# Patient Record
Sex: FEMALE | Race: WHITE | NOT HISPANIC OR LATINO | URBAN - NONMETROPOLITAN AREA
[De-identification: names, ages, dates, MRNs, and addresses within clinical notes are randomized per-mention and may not be internally consistent; named-entity substitution may affect disease eponyms.]

---

## 2017-01-19 ENCOUNTER — OFFICE VISIT (OUTPATIENT)
Dept: RETAIL CLINIC | Facility: CLINIC | Age: 52
End: 2017-01-19

## 2017-01-19 VITALS
HEART RATE: 92 BPM | WEIGHT: 204 LBS | HEIGHT: 63 IN | OXYGEN SATURATION: 97 % | TEMPERATURE: 98.5 F | BODY MASS INDEX: 36.14 KG/M2

## 2017-01-19 DIAGNOSIS — J01.00 ACUTE MAXILLARY SINUSITIS, RECURRENCE NOT SPECIFIED: Primary | ICD-10-CM

## 2017-01-19 PROCEDURE — 99213 OFFICE O/P EST LOW 20 MIN: CPT | Performed by: NURSE PRACTITIONER

## 2017-01-19 RX ORDER — FLUTICASONE PROPIONATE 50 MCG
2 SPRAY, SUSPENSION (ML) NASAL DAILY
Qty: 1 EACH | Refills: 0 | Status: SHIPPED | OUTPATIENT
Start: 2017-01-19 | End: 2017-02-18

## 2017-01-19 RX ORDER — AMOXICILLIN AND CLAVULANATE POTASSIUM 875; 125 MG/1; MG/1
1 TABLET, FILM COATED ORAL 2 TIMES DAILY
Qty: 20 TABLET | Refills: 0 | Status: SHIPPED | OUTPATIENT
Start: 2017-01-19 | End: 2017-01-29

## 2017-01-19 NOTE — PROGRESS NOTES
"Subjective   Lorna Johnson is a 51 y.o. female. Presents to the clinic today with a chief complaint of   Chief Complaint   Patient presents with   • URI        History of Present Illness     Pt presents with 2-3 week history of symptoms of nasal congestion, drainage in the back of her throat and cough.  Symptoms now worsening, bilaterally ears have a lot of pressure, she is having headaches and facial pressure.     Ms Johnson also asks if the roof of her mouth can be examined.  She states that she has a \"soft\" spot under where her dentures are placed in her mouth.       See ROS    The following portions of the patient's history were reviewed and updated as appropriate: allergies, current medications, past family history, past medical history, past social history, past surgical history and problem list.    Review of Systems   Constitutional: Negative for appetite change, chills, diaphoresis and fever.   HENT: Positive for congestion, ear pain (bilateral), postnasal drip, rhinorrhea, sinus pressure and sore throat (intermittently). Negative for ear discharge, hearing loss, trouble swallowing and voice change.    Eyes: Negative for redness and itching.   Respiratory: Positive for cough (productive of green sputum), shortness of breath and wheezing. Negative for chest tightness.    Gastrointestinal: Positive for nausea (\"due to the drainage\"). Negative for abdominal pain, diarrhea and vomiting.   Musculoskeletal: Positive for myalgias (neck hurts).   Skin: Negative for rash.   Neurological: Positive for headaches. Negative for dizziness.       Objective   No Known Allergies  Pulse 92, temperature 98.5 °F (36.9 °C), height 63\" (160 cm), weight 204 lb (92.5 kg), SpO2 97 %.    Physical Exam  General Appearance: Mildly ill appearing, No acute distress, Pleasant and Cooperative  Head/face:  Normocephalic, atraumatic  Eyes:   no conjunctival injection and non-icteric  Ears:   Bilateral TMs are mildly erythematous with diffuse " light reflex , canals are clear, no pinna or tragus tenderness with palpation  Nose/Sinuses:   Nares boggy , mucoid nasal drainage, bilateral maxillary sinus tenderness and bilateral frontal sinus tenderness  Mouth/Throat:  Posterior pharynx is mildly erythematous with a moderate amount of clear drainage and palate appears intact-- no erythema, no swelling, no lesion noted  Neck:  Supple without lymphadenopathy; trachea is midline  Lungs:  Clear to auscultation bilaterally  Heart:   Regular rate and rhythm without murmur    Assessment/Plan   Lorna was seen today for uri.    Diagnoses and all orders for this visit:    Acute maxillary sinusitis, recurrence not specified  -     amoxicillin-clavulanate (AUGMENTIN) 875-125 MG per tablet; Take 1 tablet by mouth 2 (Two) Times a Day for 10 days.  -     fluticasone (FLONASE) 50 MCG/ACT nasal spray; 2 sprays into each nostril Daily for 30 days.    Signs and symptoms as well as physical exam findings were discussed with the patient.  Plan of care was completed and patient agreed with the treatment plan. We have discussed the area of concern on your palate.  There does not appear to be any lesion or suspicious area.  However, you have agreed to see your dentist regarding your concerns.   After visit summary was given.      Patient to follow up if symptoms worsen.               Nidia Guerrero, APRN

## 2017-01-19 NOTE — MR AVS SNAPSHOT
Lorna Johnson   1/19/2017 4:45 PM   Office Visit    Dept Phone:  [phone]   Encounter #:  [id number]    Provider:  PROVIDER BEC SOMERSET   Department:  [facility]                Your Full Care Plan              Today's Medication Changes          These changes are accurate as of: 1/19/17  5:27 PM.  If you have any questions, ask your nurse or doctor.               New Medication(s)Ordered:     amoxicillin-clavulanate 875-125 MG per tablet   Commonly known as:  AUGMENTIN   Take 1 tablet by mouth 2 (Two) Times a Day for 10 days.       fluticasone 50 MCG/ACT nasal spray   Commonly known as:  FLONASE   2 sprays into each nostril Daily for 30 days.            Where to Get Your Medications      These medications were sent to [facility] [address] [place] [address] - [address] [place] [address] - [phone] [facility] [phone] [address] [address]     Phone:  [phone]     amoxicillin-clavulanate 875-125 MG per tablet    fluticasone 50 MCG/ACT nasal spray                  Your Updated Medication List          This list is accurate as of: 1/19/17  5:27 PM.  Always use your most recent med list.                amoxicillin-clavulanate 875-125 MG per tablet   Commonly known as:  AUGMENTIN   Take 1 tablet by mouth 2 (Two) Times a Day for 10 days.       fluticasone 50 MCG/ACT nasal spray   Commonly known as:  FLONASE   2 sprays into each nostril Daily for 30 days.       metFORMIN 500 MG tablet   Commonly known as:  GLUCOPHAGE               You Were Diagnosed With        Codes Comments    Acute maxillary sinusitis, recurrence not specified    -  Primary ICD-10-CM: J01.00  ICD-9-CM: 461.0       Instructions    You have been diagnosed with a sinus infection, also known as bacterial sinusitis.  An antibiotic has been prescribed for you.  Please take this antibiotic as directed for the full duration, even if you are starting to feel better.  In addition to the antibiotics, an  inhaled corticosteroid has been prescribed.  For the next 3 days, please use this nasal spray twice a day.  As we discussed, you should blow your nose gently before administering the nasal spray.  Tip your chin to your chest and use the opposite hand to nostril.Gently sniffing as you compress the nasal spray nozzle.  After use, you may find it helpful to rinse out your mouth and throat by gargling. After three days, you should use the nasal spray just once daily.    You may take OTC tylenol (acetaminophen) or motrin (ibuprofen) for pain and/or fever.  Additionally, a decongestant, such as sudafed (pseudophedrine) or sudafed PE (phenylephrine) may help with symptom relief.      Please remember that rest and fluids are the hallmark of self care when you have a respiratory system infection, whether it is bacterial or viral.  Taking care of yourself will decrease the length of the illness and you will feel better faster.          Sinusitis, Adult  Sinusitis is redness, soreness, and puffiness (inflammation) of the air pockets in the bones of your face (sinuses). The redness, soreness, and puffiness can cause air and mucus to get trapped in your sinuses. This can allow germs to grow and cause an infection.   HOME CARE   · Drink enough fluids to keep your pee (urine) clear or pale yellow.  · Use a humidifier in your home.  · Run a hot shower to create steam in the bathroom. Sit in the bathroom with the door closed. Breathe in the steam 3-4 times a day.  · Put a warm, moist washcloth on your face 3-4 times a day, or as told by your doctor.  · Use salt water sprays (saline sprays) to wet the thick fluid in your nose. This can help the sinuses drain.  · Only take medicine as told by your doctor.  GET HELP RIGHT AWAY IF:   · Your pain gets worse.  · You have very bad headaches.  · You are sick to your stomach (nauseous).  · You throw up (vomit).  · You are very sleepy (drowsy) all the time.  · Your face is puffy  "(swollen).  · Your vision changes.  · You have a stiff neck.  · You have trouble breathing.  MAKE SURE YOU:   · Understand these instructions.  · Will watch your condition.  · Will get help right away if you are not doing well or get worse.     This information is not intended to replace advice given to you by your health care provider. Make sure you discuss any questions you have with your health care provider.     Document Released: 2009 Document Revised: 2016 Document Reviewed: 2013  [employer] Interactive Patient Education  [employer]       Patient Instructions History      Upcoming Appointments     Visit Type Date Time Department    OFFICE VISIT 2017  4:45 PM [facility]      Buggl Signup     [facility] Buggl allows you to send messages to your doctor, view your test results, renew your prescriptions, schedule appointments, and more. To sign up, go to [employer] and click on the Sign Up Now link in the New User? box. Enter your Buggl Activation Code exactly as it appears below along with the last four digits of your Social Security Number and your Date of Birth () to complete the sign-up process. If you do not sign up before the expiration date, you must request a new code.    Buggl Activation Code: [email or web address]  Expires: 2017  5:27 PM    If you have questions, you can email [email or web address] or call [phone] to talk to our Buggl staff. Remember, Buggl is NOT to be used for urgent needs. For medical emergencies, dial 911.               Other Info from Your Visit           Allergies     No Known Allergies      Reason for Visit     URI           Vital Signs     Pulse Temperature Height Weight Oxygen Saturation Body Mass Index    92 98.5 °F (36.9 °C) 63\" (160 cm) 204 lb (92.5 kg) 97% 36.14 kg/m2    Smoking Status                   Current Every Day Smoker           Problems and Diagnoses Noted     Acute maxillary sinusitis  "   -  Primary

## 2017-01-19 NOTE — LETTER
January 19, 2017    Lorna Johnson  [address] [facility] [place] [address]        It is my medical opinion that Lorna Johnson will need 5-7 minutes for her bathroom breaks at work.  If you have further questions or concerns, please contact the clinic.                        LEEANN Haq      PROVIDER BEC Levan

## 2017-01-19 NOTE — LETTER
January 19, 2017     Patient: Lorna Johnson   YOB: 1965   Date of Visit: 1/19/2017       To Whom It May Concern:    It is my medical opinion that Lorna Johnson must be released from work January 17 through January 19, 2017.  She may return to work on January 20, 2017..           Sincerely,    LEEANN Haq      PROVIDER [facility] SOMECJ    CC: No Recipients

## 2017-01-19 NOTE — PATIENT INSTRUCTIONS
You have been diagnosed with a sinus infection, also known as bacterial sinusitis.  An antibiotic has been prescribed for you.  Please take this antibiotic as directed for the full duration, even if you are starting to feel better.  In addition to the antibiotics, an inhaled corticosteroid has been prescribed.  For the next 3 days, please use this nasal spray twice a day.  As we discussed, you should blow your nose gently before administering the nasal spray.  Tip your chin to your chest and use the opposite hand to nostril.Gently sniffing as you compress the nasal spray nozzle.  After use, you may find it helpful to rinse out your mouth and throat by gargling. After three days, you should use the nasal spray just once daily.    You may take OTC tylenol (acetaminophen) or motrin (ibuprofen) for pain and/or fever.  Additionally, a decongestant, such as sudafed (pseudophedrine) or sudafed PE (phenylephrine) may help with symptom relief.      Please remember that rest and fluids are the hallmark of self care when you have a respiratory system infection, whether it is bacterial or viral.  Taking care of yourself will decrease the length of the illness and you will feel better faster.          Sinusitis, Adult  Sinusitis is redness, soreness, and puffiness (inflammation) of the air pockets in the bones of your face (sinuses). The redness, soreness, and puffiness can cause air and mucus to get trapped in your sinuses. This can allow germs to grow and cause an infection.   HOME CARE   · Drink enough fluids to keep your pee (urine) clear or pale yellow.  · Use a humidifier in your home.  · Run a hot shower to create steam in the bathroom. Sit in the bathroom with the door closed. Breathe in the steam 3-4 times a day.  · Put a warm, moist washcloth on your face 3-4 times a day, or as told by your doctor.  · Use salt water sprays (saline sprays) to wet the thick fluid in your nose. This can help the sinuses drain.  · Only  take medicine as told by your doctor.  GET HELP RIGHT AWAY IF:   · Your pain gets worse.  · You have very bad headaches.  · You are sick to your stomach (nauseous).  · You throw up (vomit).  · You are very sleepy (drowsy) all the time.  · Your face is puffy (swollen).  · Your vision changes.  · You have a stiff neck.  · You have trouble breathing.  MAKE SURE YOU:   · Understand these instructions.  · Will watch your condition.  · Will get help right away if you are not doing well or get worse.     This information is not intended to replace advice given to you by your health care provider. Make sure you discuss any questions you have with your health care provider.     Document Released: 06/05/2009 Document Revised: 01/08/2016 Document Reviewed: 07/23/2013  [employer] Interactive Patient Education ©2016 [employer]

## 2017-03-29 ENCOUNTER — OFFICE VISIT (OUTPATIENT)
Dept: RETAIL CLINIC | Facility: CLINIC | Age: 52
End: 2017-03-29

## 2017-03-29 VITALS
WEIGHT: 205 LBS | OXYGEN SATURATION: 97 % | TEMPERATURE: 97.6 F | BODY MASS INDEX: 36.31 KG/M2 | RESPIRATION RATE: 18 BRPM | HEART RATE: 74 BPM

## 2017-03-29 DIAGNOSIS — J10.1 INFLUENZA A: Primary | ICD-10-CM

## 2017-03-29 LAB
EXPIRATION DATE: NORMAL
FLUAV AG NPH QL: POSITIVE
FLUBV AG NPH QL: NEGATIVE
INTERNAL CONTROL: NORMAL
Lab: NORMAL

## 2017-03-29 PROCEDURE — 99213 OFFICE O/P EST LOW 20 MIN: CPT | Performed by: NURSE PRACTITIONER

## 2017-03-29 PROCEDURE — 87804 INFLUENZA ASSAY W/OPTIC: CPT | Performed by: NURSE PRACTITIONER

## 2017-03-29 RX ORDER — ALBUTEROL SULFATE 90 UG/1
2 AEROSOL, METERED RESPIRATORY (INHALATION) EVERY 4 HOURS PRN
Qty: 6.7 G | Refills: 0 | Status: SHIPPED | OUTPATIENT
Start: 2017-03-29

## 2017-03-29 RX ORDER — OSELTAMIVIR PHOSPHATE 75 MG/1
75 CAPSULE ORAL 2 TIMES DAILY
Qty: 10 CAPSULE | Refills: 0 | Status: SHIPPED | OUTPATIENT
Start: 2017-03-29

## 2017-03-29 NOTE — PROGRESS NOTES
Subjective   Lorna Johnson is a 51 y.o. female.   Chief Complaint   Patient presents with   • Flu Symptoms      HPI Comments: Fever, chills, cough, sore throat, nasal congestion started abruptly yesterday.  Taking tylenol/motrin with some relief of fever.  Did not have flu vaccine.       Flu Symptoms   Associated symptoms include chills, congestion, coughing, fatigue, a fever, headaches and a sore throat. Pertinent negatives include no abdominal pain, chest pain, nausea, rash or vomiting.        The following portions of the patient's history were reviewed and updated as appropriate: allergies, current medications, past family history, past medical history, past social history, past surgical history and problem list.    Current Outpatient Prescriptions:   •  metFORMIN (GLUCOPHAGE) 500 MG tablet, Take 500 mg by mouth 2 (Two) Times a Day With Meals., Disp: , Rfl:   •  albuterol (PROVENTIL HFA;VENTOLIN HFA) 108 (90 BASE) MCG/ACT inhaler, Inhale 2 puffs Every 4 (Four) Hours As Needed for Wheezing., Disp: 6.7 g, Rfl: 0  •  oseltamivir (TAMIFLU) 75 MG capsule, Take 1 capsule by mouth 2 (Two) Times a Day., Disp: 10 capsule, Rfl: 0    Review of Systems   Constitutional: Positive for appetite change (slightly decreased), chills, fatigue and fever.        No body aches   HENT: Positive for congestion, postnasal drip, rhinorrhea, sinus pressure, sneezing, sore throat and voice change. Negative for ear pain, facial swelling and mouth sores. Ear discharge: pressure.    Eyes: Negative for discharge.   Respiratory: Positive for cough. Negative for chest tightness, shortness of breath and wheezing.    Cardiovascular: Negative for chest pain.   Gastrointestinal: Negative for abdominal pain, diarrhea, nausea and vomiting.   Skin: Negative for rash.   Neurological: Positive for headaches. Negative for dizziness and light-headedness.     Pulse 74  Temp 97.6 °F (36.4 °C) (Temporal Artery )   Resp 18  Wt 205 lb (93 kg)  SpO2 97%   BMI 36.31 kg/m2    Objective   No Known Allergies    Physical Exam   Constitutional: She is oriented to person, place, and time. She appears well-developed and well-nourished. She appears ill (mild). No distress.   HENT:   Head: Normocephalic and atraumatic.   Right Ear: External ear normal. Tympanic membrane is not erythematous. A middle ear effusion is present.   Left Ear: External ear normal. Tympanic membrane is not erythematous. A middle ear effusion is present.   Nose: Rhinorrhea present. Right sinus exhibits no maxillary sinus tenderness and no frontal sinus tenderness. Left sinus exhibits no maxillary sinus tenderness and no frontal sinus tenderness.   Mouth/Throat: Posterior oropharyngeal erythema (Mild with clear post nasal drip) present. No oropharyngeal exudate or posterior oropharyngeal edema.   Bilateral TM with mild serous effusion.    Significant nasal congestion.   Eyes: Conjunctivae, EOM and lids are normal.   Neck: Full passive range of motion without pain.   Cardiovascular: Normal rate and regular rhythm.    Pulmonary/Chest: Effort normal. No respiratory distress. She has wheezes (trace wheeze, no rhonchi).   Abdominal: Soft. Normal appearance and bowel sounds are normal. There is no tenderness.   Lymphadenopathy:        Head (right side): No tonsillar adenopathy present.        Head (left side): No tonsillar adenopathy present.     She has no cervical adenopathy.   Neurological: She is alert and oriented to person, place, and time.   Skin: Skin is warm and dry. No rash noted.       Assessment/Plan   Lorna was seen today for flu symptoms.    Diagnoses and all orders for this visit:    Influenza A  -     POC Influenza A / B    Other orders  -     albuterol (PROVENTIL HFA;VENTOLIN HFA) 108 (90 BASE) MCG/ACT inhaler; Inhale 2 puffs Every 4 (Four) Hours As Needed for Wheezing.  -     oseltamivir (TAMIFLU) 75 MG capsule; Take 1 capsule by mouth 2 (Two) Times a Day.      Results for orders placed or  performed in visit on 03/29/17   POC Influenza A / B   Result Value Ref Range    Rapid Influenza A Ag Positive     Rapid Influenza B Ag Negative     Internal Control Passed Passed    Lot Number [id number]     Expiration Date 11/2018

## 2017-03-29 NOTE — PATIENT INSTRUCTIONS
"You have tested positive today for influenza or \"the flu.\" Because your symptoms began less than 48 hours ago, you are being treated with Tamiflu. This medication will not cure the flu, but it may help with reducing the severity and duration of the symptoms. The flu is a viral illness, and can last 10-14 days before it resolves. Symptomatic treatment is recommended - antibiotics will not help. Take Ibuprofen or Tylenol as needed for fever. Mucinex or Robitussion may help with clearing cough and congestion. Increase your intake of clear, decaffinated fluids and get plenty of rest. For fever that persists beyond 5 days or worsening symptoms, follow up with your primary care provider. Pt verbalized understanding, visit summary given.     "

## 2017-04-17 ENCOUNTER — OFFICE VISIT (OUTPATIENT)
Dept: RETAIL CLINIC | Facility: CLINIC | Age: 52
End: 2017-04-17

## 2017-04-17 DIAGNOSIS — Z02.83 ENCOUNTER FOR DRUG SCREENING: Primary | ICD-10-CM

## 2017-04-17 NOTE — PROGRESS NOTES
Subjective   Lorna Johnson is a 51 y.o. female.     Reason for Appointment  1. escreen    History of Present Illness  HPI:   See scanned document. See custody control form.    Assessments  1. Encounter for screening, unspecified - Z13.9    This document has been electronically signed by LEEANN Aldrich April 17, 2017 10:09 AM

## 2017-04-27 ENCOUNTER — OFFICE VISIT (OUTPATIENT)
Dept: RETAIL CLINIC | Facility: CLINIC | Age: 52
End: 2017-04-27

## 2017-04-27 VITALS
TEMPERATURE: 97.7 F | HEART RATE: 76 BPM | WEIGHT: 197.2 LBS | OXYGEN SATURATION: 97 % | HEIGHT: 63 IN | BODY MASS INDEX: 34.94 KG/M2 | RESPIRATION RATE: 20 BRPM

## 2017-04-27 DIAGNOSIS — K52.9 GASTROENTERITIS: Primary | ICD-10-CM

## 2017-04-27 PROCEDURE — 99213 OFFICE O/P EST LOW 20 MIN: CPT | Performed by: NURSE PRACTITIONER

## 2017-04-27 RX ORDER — FAMOTIDINE 10 MG
10 TABLET ORAL 2 TIMES DAILY
COMMUNITY

## 2017-04-27 RX ORDER — ONDANSETRON 4 MG/1
4 TABLET, ORALLY DISINTEGRATING ORAL EVERY 8 HOURS PRN
Qty: 15 TABLET | Refills: 0 | Status: SHIPPED | OUTPATIENT
Start: 2017-04-27 | End: 2017-05-02

## 2017-04-27 RX ORDER — CLARITHROMYCIN 250 MG/1
250 TABLET, FILM COATED ORAL 2 TIMES DAILY
COMMUNITY

## 2017-04-27 NOTE — PROGRESS NOTES
"Subjective   Lorna Johnson is a 51 y.o. female.   Chief Complaint   Patient presents with   • Vomiting      Vomiting    This is a new problem. The current episode started yesterday. The problem has been gradually improving. There has been no fever. Associated symptoms include diarrhea (she has a history of IBS). Pertinent negatives include no abdominal pain, fever or myalgias. She has tried nothing for the symptoms.      Lorna presents to the clinic today c/o vomiting which started yesterday. Associated symptoms include She was able to tolerate a little bit of chicken salad.  She has not tried any treatments for her symptoms.   She is finishing her treatment for H. Pylori. Refer to ROS for additional information.    The following portions of the patient's history were reviewed and updated as appropriate: allergies, current medications, past family history, past medical history, past social history, past surgical history and problem list.    Review of Systems   Constitutional: Positive for appetite change. Negative for fever.   Gastrointestinal: Positive for diarrhea (she has a history of IBS), nausea and vomiting. Negative for abdominal pain.   Musculoskeletal: Negative for myalgias.       No Known Allergies    Pulse 76  Temp 97.7 °F (36.5 °C) (Oral)   Resp 20  Ht 63\" (160 cm)  Wt 197 lb 3.2 oz (89.4 kg)  SpO2 97%  BMI 34.93 kg/m2      Objective     Physical Exam   Constitutional: She is oriented to person, place, and time. She appears well-developed and well-nourished.   HENT:   Head: Normocephalic.   Cardiovascular: Normal rate, regular rhythm and normal heart sounds.    Pulmonary/Chest: Effort normal and breath sounds normal.   Abdominal: Soft. Normal appearance. Bowel sounds are increased.   Neurological: She is alert and oriented to person, place, and time.   Skin: Skin is warm and dry.   Nursing note and vitals reviewed.      Assessment/Plan   Lorna was seen today for vomiting.    Diagnoses and all " orders for this visit:    Gastroenteritis  -     ondansetron ODT (ZOFRAN ODT) 4 MG disintegrating tablet; Take 1 tablet by mouth Every 8 (Eight) Hours As Needed for Nausea or Vomiting for up to 5 days.             Increase fluids, discussed the BRAT diet. Follow up with PCP or at the Urgent Care if symptoms worsen or fail to improve within next 48-72 hours.  Patient teaching information discussed and provided to the patient. Patient verbalized understanding.

## 2017-04-27 NOTE — PATIENT INSTRUCTIONS
Viral Gastroenteritis, Adult  Viral gastroenteritis is also known as the stomach flu. This condition is caused by various viruses. These viruses can be passed from person to person very easily (are very contagious). This condition may affect your stomach, small intestine, and large intestine. It can cause sudden watery diarrhea, fever, and vomiting.  Diarrhea and vomiting can make you feel weak and cause you to become dehydrated. You may not be able to keep fluids down. Dehydration can make you tired and thirsty, cause you to have a dry mouth, and decrease how often you urinate. Older adults and people with other diseases or a weak immune system are at higher risk for dehydration.  It is important to replace the fluids that you lose from diarrhea and vomiting. If you become severely dehydrated, you may need to get fluids through an IV tube.  CAUSES  Gastroenteritis is caused by various viruses, including rotavirus and norovirus. Norovirus is the most common cause in adults.  You can get sick by eating food, drinking water, or touching a surface contaminated with one of these viruses. You can also get sick from sharing utensils or other personal items with an infected person.  RISK FACTORS  This condition is more likely to develop in people:  · Who have a weak defense system (immune system).  · Who live with one or more children who are younger than 2 years old.  · Who live in a nursing home.  · Who go on cruise ships.  SYMPTOMS  Symptoms of this condition start suddenly 1-2 days after exposure to a virus. Symptoms may last a few days or as long as a week. The most common symptoms are watery diarrhea and vomiting. Other symptoms include:  · Fever.  · Headache.  · Fatigue.  · Pain in the abdomen.  · Chills.  · Weakness.  · Nausea.  · Muscle aches.  · Loss of appetite.  DIAGNOSIS  This condition is diagnosed with a medical history and physical exam. You may also have a stool test to check for viruses or other  infections.  TREATMENT  This condition typically goes away on its own. The focus of treatment is to restore lost fluids (rehydration). Your health care provider may recommend that you take an oral rehydration solution (ORS) to replace important salts and minerals (electrolytes) in your body. Severe cases of this condition may require giving fluids through an IV tube.  Treatment may also include medicine to help with your symptoms.  HOME CARE INSTRUCTIONS  Follow instructions from your health care provider about how to care for yourself at home.  Eating and Drinking  Follow these recommendations as told by your health care provider:  · Take an ORS. This is a drink that is sold at pharmacies and retail stores.  · Drink clear fluids in small amounts as you are able. Clear fluids include water, ice chips, diluted fruit juice, and low-calorie sports drinks.  · Eat bland, easy-to-digest foods in small amounts as you are able. These foods include bananas, applesauce, rice, lean meats, toast, and crackers.  · Avoid fluids that contain a lot of sugar or caffeine, such as energy drinks, sports drinks, and soda.  · Avoid alcohol.  · Avoid spicy or fatty foods.  General Instructions  · Drink enough fluid to keep your urine clear or pale yellow.  · Wash your hands often. If soap and water are not available, use hand .  · Make sure that all people in your household wash their hands well and often.  · Take over-the-counter and prescription medicines only as told by your health care provider.  · Rest at home while you recover.  · Watch your condition for any changes.  · Take a warm bath to relieve any burning or pain from frequent diarrhea episodes.  · Keep all follow-up visits as told by your health care provider. This is important.  SEEK MEDICAL CARE IF:  · You cannot keep fluids down.  · Your symptoms get worse.  · You have new symptoms.  · You feel light-headed or dizzy.  · You have muscle cramps.  SEEK IMMEDIATE  MEDICAL CARE IF:  · You have chest pain.  · You feel extremely weak or you faint.  · You see blood in your vomit.  · Your vomit looks like coffee grounds.  · You have bloody or black stools or stools that look like tar.  · You have a severe headache, a stiff neck, or both.  · You have a rash.  · You have severe pain, cramping, or bloating in your abdomen.  · You have trouble breathing or you are breathing very quickly.  · Your heart is beating very quickly.  · Your skin feels cold and clammy.  · You feel confused.  · You have pain when you urinate.  · You have signs of dehydration, such as:    Dark urine, very little urine, or no urine.    Cracked lips.    Dry mouth.    Sunken eyes.    Sleepiness.    Weakness.     This information is not intended to replace advice given to you by your health care provider. Make sure you discuss any questions you have with your health care provider.     Document Released: 12/18/2006 Document Revised: 01/13/2017 Document Reviewed: 08/23/2016  [employer] Interactive Patient Education ©2016 [employer]    Viral Gastroenteritis, Adult  Viral gastroenteritis is also known as the stomach flu. This condition is caused by various viruses. These viruses can be passed from person to person very easily (are very contagious). This condition may affect your stomach, small intestine, and large intestine. It can cause sudden watery diarrhea, fever, and vomiting.  Diarrhea and vomiting can make you feel weak and cause you to become dehydrated. You may not be able to keep fluids down. Dehydration can make you tired and thirsty, cause you to have a dry mouth, and decrease how often you urinate. Older adults and people with other diseases or a weak immune system are at higher risk for dehydration.  It is important to replace the fluids that you lose from diarrhea and vomiting. If you become severely dehydrated, you may need to get fluids through an IV tube.  CAUSES  Gastroenteritis is caused by various  viruses, including rotavirus and norovirus. Norovirus is the most common cause in adults.  You can get sick by eating food, drinking water, or touching a surface contaminated with one of these viruses. You can also get sick from sharing utensils or other personal items with an infected person.  RISK FACTORS  This condition is more likely to develop in people:  · Who have a weak defense system (immune system).  · Who live with one or more children who are younger than 2 years old.  · Who live in a nursing home.  · Who go on cruise ships.  SYMPTOMS  Symptoms of this condition start suddenly 1-2 days after exposure to a virus. Symptoms may last a few days or as long as a week. The most common symptoms are watery diarrhea and vomiting. Other symptoms include:  · Fever.  · Headache.  · Fatigue.  · Pain in the abdomen.  · Chills.  · Weakness.  · Nausea.  · Muscle aches.  · Loss of appetite.  DIAGNOSIS  This condition is diagnosed with a medical history and physical exam. You may also have a stool test to check for viruses or other infections.  TREATMENT  This condition typically goes away on its own. The focus of treatment is to restore lost fluids (rehydration). Your health care provider may recommend that you take an oral rehydration solution (ORS) to replace important salts and minerals (electrolytes) in your body. Severe cases of this condition may require giving fluids through an IV tube.  Treatment may also include medicine to help with your symptoms.  HOME CARE INSTRUCTIONS  Follow instructions from your health care provider about how to care for yourself at home.  Eating and Drinking  Follow these recommendations as told by your health care provider:  · Take an ORS. This is a drink that is sold at pharmacies and retail stores.  · Drink clear fluids in small amounts as you are able. Clear fluids include water, ice chips, diluted fruit juice, and low-calorie sports drinks.  · Eat bland, easy-to-digest foods in small  amounts as you are able. These foods include bananas, applesauce, rice, lean meats, toast, and crackers.  · Avoid fluids that contain a lot of sugar or caffeine, such as energy drinks, sports drinks, and soda.  · Avoid alcohol.  · Avoid spicy or fatty foods.  General Instructions  · Drink enough fluid to keep your urine clear or pale yellow.  · Wash your hands often. If soap and water are not available, use hand .  · Make sure that all people in your household wash their hands well and often.  · Take over-the-counter and prescription medicines only as told by your health care provider.  · Rest at home while you recover.  · Watch your condition for any changes.  · Take a warm bath to relieve any burning or pain from frequent diarrhea episodes.  · Keep all follow-up visits as told by your health care provider. This is important.  SEEK MEDICAL CARE IF:  · You cannot keep fluids down.  · Your symptoms get worse.  · You have new symptoms.  · You feel light-headed or dizzy.  · You have muscle cramps.  SEEK IMMEDIATE MEDICAL CARE IF:  · You have chest pain.  · You feel extremely weak or you faint.  · You see blood in your vomit.  · Your vomit looks like coffee grounds.  · You have bloody or black stools or stools that look like tar.  · You have a severe headache, a stiff neck, or both.  · You have a rash.  · You have severe pain, cramping, or bloating in your abdomen.  · You have trouble breathing or you are breathing very quickly.  · Your heart is beating very quickly.  · Your skin feels cold and clammy.  · You feel confused.  · You have pain when you urinate.  · You have signs of dehydration, such as:    Dark urine, very little urine, or no urine.    Cracked lips.    Dry mouth.    Sunken eyes.    Sleepiness.    Weakness.     This information is not intended to replace advice given to you by your health care provider. Make sure you discuss any questions you have with your health care provider.     Document  Released: 12/18/2006 Document Revised: 01/13/2017 Document Reviewed: 08/23/2016  [employer] Interactive Patient Education ©2016 [employer]